# Patient Record
Sex: FEMALE | Race: WHITE | NOT HISPANIC OR LATINO | Employment: OTHER | URBAN - METROPOLITAN AREA
[De-identification: names, ages, dates, MRNs, and addresses within clinical notes are randomized per-mention and may not be internally consistent; named-entity substitution may affect disease eponyms.]

---

## 2020-10-01 ENCOUNTER — OFFICE VISIT (OUTPATIENT)
Dept: URBAN - METROPOLITAN AREA CLINIC 35 | Facility: CLINIC | Age: 75
End: 2020-10-01

## 2020-10-01 VITALS
WEIGHT: 209 LBS | SYSTOLIC BLOOD PRESSURE: 120 MMHG | DIASTOLIC BLOOD PRESSURE: 78 MMHG | HEIGHT: 66 IN | TEMPERATURE: 98 F | BODY MASS INDEX: 33.59 KG/M2

## 2020-10-01 RX ORDER — CLONIDINE HYDROCHLORIDE 0.3 MG/1
1 TABLET TABLET ORAL THREE TIMES A DAY
Status: ACTIVE | COMMUNITY

## 2020-10-01 RX ORDER — ISOSORBIDE MONONITRATE 30 MG/1
1 TABLET IN THE MORNING TABLET, EXTENDED RELEASE ORAL ONCE A DAY
Status: ACTIVE | COMMUNITY

## 2020-10-01 RX ORDER — SODIUM SULFATE, POTASSIUM SULFATE, MAGNESIUM SULFATE 17.5; 3.13; 1.6 G/ML; G/ML; G/ML
AS DIRECTED SOLUTION, CONCENTRATE ORAL
Qty: 1 | Refills: 0 | OUTPATIENT
Start: 2020-10-01

## 2020-10-01 RX ORDER — POTASSIUM CHLORIDE 1.5 G/1
1 PACKET WITH FOOD POWDER, FOR SOLUTION ORAL ONCE A DAY
Qty: 30 | Status: ACTIVE | COMMUNITY

## 2020-10-01 RX ORDER — SIMVASTATIN 20 MG/1
1 TABLET IN THE EVENING TABLET, FILM COATED ORAL ONCE A DAY
Status: ACTIVE | COMMUNITY

## 2020-10-01 RX ORDER — DORZOLAMIDE HYDROCHLORIDE AND TIMOLOL MALEATE 20; 5 MG/ML; MG/ML
1 DROP INTO AFFECTED EYE SOLUTION OPHTHALMIC TWICE A DAY
Status: ACTIVE | COMMUNITY

## 2020-10-01 RX ORDER — ALLOPURINOL 100 MG/1
1 TABLET TABLET ORAL ONCE A DAY
Qty: 30 | Status: ACTIVE | COMMUNITY

## 2020-10-01 RX ORDER — AMLODIPINE BESYLATE 10 MG/1
1 TABLET TABLET ORAL ONCE A DAY
Status: ACTIVE | COMMUNITY

## 2020-10-01 RX ORDER — ASPIRIN 325 MG/1
1 TABLET TABLET, FILM COATED ORAL ONCE A DAY
Qty: 30 | Status: ACTIVE | COMMUNITY

## 2020-10-01 RX ORDER — LISINOPRIL AND HYDROCHLOROTHIAZIDE TABLETS 20; 12.5 MG/1; MG/1
1 TABLET TABLET ORAL TWICE A DAY
Status: ACTIVE | COMMUNITY

## 2020-10-01 NOTE — HPI-MIGRATED HPI
;     Colorectal Cancer Screening : Patient is a 74-year-old  female, who presents today for a colorectal cancer screening. Patient's last colonoscopy performed on 05/09/2011 with Dr. Mckeon revealed colitis, hemorrhoids. Patient denies a family hx of colon cancer/polyps and gastric/esophageal cancer/polyps. Patient currently admits 1 bowel movement a day. Stools are typically formed. Admits occasional gas/bloating after eating certain food. Patient denies melena, blood, or mucus in stool, heartburn, nausea, vomiting, diarrhea, or constipation.              ;

## 2020-11-02 ENCOUNTER — OFFICE VISIT (OUTPATIENT)
Dept: URBAN - METROPOLITAN AREA MEDICAL CENTER 27 | Facility: MEDICAL CENTER | Age: 75
End: 2020-11-02

## 2020-11-03 ENCOUNTER — TELEPHONE ENCOUNTER (OUTPATIENT)
Dept: URBAN - METROPOLITAN AREA CLINIC 35 | Facility: CLINIC | Age: 75
End: 2020-11-03

## 2020-11-04 ENCOUNTER — TELEPHONE ENCOUNTER (OUTPATIENT)
Dept: URBAN - METROPOLITAN AREA CLINIC 35 | Facility: CLINIC | Age: 75
End: 2020-11-04

## 2020-11-11 ENCOUNTER — TELEPHONE ENCOUNTER (OUTPATIENT)
Dept: URBAN - METROPOLITAN AREA CLINIC 35 | Facility: CLINIC | Age: 75
End: 2020-11-11

## 2020-11-16 ENCOUNTER — DASHBOARD ENCOUNTERS (OUTPATIENT)
Age: 75
End: 2020-11-16

## 2020-11-16 ENCOUNTER — OFFICE VISIT (OUTPATIENT)
Dept: URBAN - METROPOLITAN AREA CLINIC 35 | Facility: CLINIC | Age: 75
End: 2020-11-16

## 2020-11-16 VITALS
TEMPERATURE: 97.5 F | DIASTOLIC BLOOD PRESSURE: 70 MMHG | BODY MASS INDEX: 33.37 KG/M2 | SYSTOLIC BLOOD PRESSURE: 125 MMHG | HEIGHT: 66 IN | WEIGHT: 207.6 LBS

## 2020-11-16 PROBLEM — 197125005 IRRITABLE BOWEL SYNDROME WITH DIARRHEA: Status: ACTIVE | Noted: 2020-11-16

## 2020-11-16 PROBLEM — 398050005 DIVERTICULAR DISEASE OF COLON: Status: ACTIVE | Noted: 2020-11-16

## 2020-11-16 RX ORDER — LISINOPRIL AND HYDROCHLOROTHIAZIDE TABLETS 20; 12.5 MG/1; MG/1
1 TABLET TABLET ORAL TWICE A DAY
Status: ACTIVE | COMMUNITY

## 2020-11-16 RX ORDER — SIMVASTATIN 20 MG/1
1 TABLET IN THE EVENING TABLET, FILM COATED ORAL ONCE A DAY
Status: ACTIVE | COMMUNITY

## 2020-11-16 RX ORDER — HYOSCYAMINE SULFATE 0.12 MG/1
1 TABLET AS NEEDED TABLET ORAL
Qty: 30 | Refills: 0 | OUTPATIENT
Start: 2020-11-16

## 2020-11-16 RX ORDER — ISOSORBIDE MONONITRATE 30 MG/1
1 TABLET IN THE MORNING TABLET, EXTENDED RELEASE ORAL ONCE A DAY
Status: ACTIVE | COMMUNITY

## 2020-11-16 RX ORDER — AMLODIPINE BESYLATE 10 MG/1
1 TABLET TABLET ORAL ONCE A DAY
Status: ACTIVE | COMMUNITY

## 2020-11-16 RX ORDER — ASPIRIN 325 MG/1
1 TABLET TABLET, FILM COATED ORAL ONCE A DAY
Qty: 30 | Status: ACTIVE | COMMUNITY

## 2020-11-16 RX ORDER — SODIUM SULFATE, POTASSIUM SULFATE, MAGNESIUM SULFATE 17.5; 3.13; 1.6 G/ML; G/ML; G/ML
AS DIRECTED SOLUTION, CONCENTRATE ORAL
Qty: 1 | Refills: 0 | Status: DISCONTINUED | COMMUNITY
Start: 2020-10-01

## 2020-11-16 RX ORDER — CLONIDINE HYDROCHLORIDE 0.3 MG/1
1 TABLET TABLET ORAL THREE TIMES A DAY
Status: ACTIVE | COMMUNITY

## 2020-11-16 RX ORDER — POTASSIUM CHLORIDE 1.5 G/1
1 PACKET WITH FOOD POWDER, FOR SOLUTION ORAL ONCE A DAY
Qty: 30 | Status: ACTIVE | COMMUNITY

## 2020-11-16 RX ORDER — DORZOLAMIDE HYDROCHLORIDE AND TIMOLOL MALEATE 20; 5 MG/ML; MG/ML
1 DROP INTO AFFECTED EYE SOLUTION OPHTHALMIC TWICE A DAY
Status: ACTIVE | COMMUNITY

## 2020-11-16 RX ORDER — ALLOPURINOL 100 MG/1
1 TABLET TABLET ORAL ONCE A DAY
Qty: 30 | Status: ACTIVE | COMMUNITY

## 2020-11-16 NOTE — HPI-MIGRATED HPI
;     Colonoscopy Follow-Up : Patient is a 74-year-old  female, who presents today to follow up s/p colonoscopy. Colonoscopy performed on 11/02/2020. Patient denies any complications after the procedure. Patient has 1 bowel movement every other day. Stools are typically formed. Patient denies abdominal pain, melena, mucus, or blood in stool.   She states she was having some cramping and some diarrhea, but states they are very intermittent, and denies any weight loss or nocturnal diarrhea.  Last visit (10/01/2020)             Patient is a 74-year-old  female, who presents today for a colorectal cancer screening. Patient's last colonoscopy performed on 05/09/2011 with Dr. Mckeon revealed colitis, hemorrhoids. Patient denies a family hx of colon cancer/polyps and gastric/esophageal cancer/polyps. Patient currently admits 1 bowel movement a day. Stools are typically formed. Admits occasional gas/bloating after eating certain food. Patient denies melena, blood, or mucus in stool, heartburn, nausea, vomiting, diarrhea, or constipation.  ;